# Patient Record
Sex: MALE | Race: WHITE | Employment: FULL TIME | URBAN - METROPOLITAN AREA
[De-identification: names, ages, dates, MRNs, and addresses within clinical notes are randomized per-mention and may not be internally consistent; named-entity substitution may affect disease eponyms.]

---

## 2019-11-04 ENCOUNTER — HOSPITAL ENCOUNTER (EMERGENCY)
Facility: CLINIC | Age: 39
Discharge: HOME | End: 2019-11-04
Attending: EMERGENCY MEDICINE | Admitting: EMERGENCY MEDICINE
Payer: COMMERCIAL

## 2019-11-04 VITALS
BODY MASS INDEX: 36.57 KG/M2 | DIASTOLIC BLOOD PRESSURE: 73 MMHG | RESPIRATION RATE: 16 BRPM | WEIGHT: 270 LBS | SYSTOLIC BLOOD PRESSURE: 157 MMHG | TEMPERATURE: 98.8 F | HEIGHT: 72 IN | HEART RATE: 80 BPM | OXYGEN SATURATION: 99 %

## 2019-11-04 DIAGNOSIS — M54.6 LEFT-SIDED THORACIC BACK PAIN, UNSPECIFIED CHRONICITY: ICD-10-CM

## 2019-11-04 LAB
ANION GAP SERPL CALCULATED.3IONS-SCNC: 5 MMOL/L (ref 3–14)
BASOPHILS # BLD AUTO: 0 10E9/L (ref 0–0.2)
BASOPHILS NFR BLD AUTO: 0.4 %
BUN SERPL-MCNC: 16 MG/DL (ref 7–30)
CALCIUM SERPL-MCNC: 8.9 MG/DL (ref 8.5–10.1)
CHLORIDE SERPL-SCNC: 103 MMOL/L (ref 94–109)
CO2 SERPL-SCNC: 31 MMOL/L (ref 20–32)
CREAT SERPL-MCNC: 0.9 MG/DL (ref 0.66–1.25)
D DIMER PPP FEU-MCNC: <0.3 UG/ML FEU (ref 0–0.5)
DIFFERENTIAL METHOD BLD: NORMAL
EOSINOPHIL # BLD AUTO: 0.3 10E9/L (ref 0–0.7)
EOSINOPHIL NFR BLD AUTO: 3.1 %
ERYTHROCYTE [DISTWIDTH] IN BLOOD BY AUTOMATED COUNT: 12.4 % (ref 10–15)
GFR SERPL CREATININE-BSD FRML MDRD: >90 ML/MIN/{1.73_M2}
GLUCOSE SERPL-MCNC: 190 MG/DL (ref 70–99)
HCT VFR BLD AUTO: 43.4 % (ref 40–53)
HGB BLD-MCNC: 15.1 G/DL (ref 13.3–17.7)
IMM GRANULOCYTES # BLD: 0 10E9/L (ref 0–0.4)
IMM GRANULOCYTES NFR BLD: 0.2 %
INTERPRETATION ECG - MUSE: NORMAL
LYMPHOCYTES # BLD AUTO: 2.9 10E9/L (ref 0.8–5.3)
LYMPHOCYTES NFR BLD AUTO: 35.1 %
MCH RBC QN AUTO: 29.5 PG (ref 26.5–33)
MCHC RBC AUTO-ENTMCNC: 34.8 G/DL (ref 31.5–36.5)
MCV RBC AUTO: 85 FL (ref 78–100)
MONOCYTES # BLD AUTO: 0.7 10E9/L (ref 0–1.3)
MONOCYTES NFR BLD AUTO: 8.3 %
NEUTROPHILS # BLD AUTO: 4.4 10E9/L (ref 1.6–8.3)
NEUTROPHILS NFR BLD AUTO: 52.9 %
NRBC # BLD AUTO: 0 10*3/UL
NRBC BLD AUTO-RTO: 0 /100
PLATELET # BLD AUTO: 171 10E9/L (ref 150–450)
POTASSIUM SERPL-SCNC: 4 MMOL/L (ref 3.4–5.3)
RBC # BLD AUTO: 5.11 10E12/L (ref 4.4–5.9)
SODIUM SERPL-SCNC: 139 MMOL/L (ref 133–144)
TROPONIN I SERPL-MCNC: <0.015 UG/L (ref 0–0.04)
WBC # BLD AUTO: 8.3 10E9/L (ref 4–11)

## 2019-11-04 PROCEDURE — 99284 EMERGENCY DEPT VISIT MOD MDM: CPT

## 2019-11-04 PROCEDURE — 84484 ASSAY OF TROPONIN QUANT: CPT | Performed by: EMERGENCY MEDICINE

## 2019-11-04 PROCEDURE — 80048 BASIC METABOLIC PNL TOTAL CA: CPT | Performed by: EMERGENCY MEDICINE

## 2019-11-04 PROCEDURE — 85025 COMPLETE CBC W/AUTO DIFF WBC: CPT | Performed by: EMERGENCY MEDICINE

## 2019-11-04 PROCEDURE — 93005 ELECTROCARDIOGRAM TRACING: CPT

## 2019-11-04 PROCEDURE — 85379 FIBRIN DEGRADATION QUANT: CPT | Performed by: EMERGENCY MEDICINE

## 2019-11-04 ASSESSMENT — ENCOUNTER SYMPTOMS
SHORTNESS OF BREATH: 1
FEVER: 0
BACK PAIN: 1
SORE THROAT: 0
LIGHT-HEADEDNESS: 1
JOINT SWELLING: 1

## 2019-11-04 ASSESSMENT — MIFFLIN-ST. JEOR: SCORE: 2167.71

## 2019-11-04 NOTE — ED AVS SNAPSHOT
Emergency Department  [address] [place] [id number]  Phone:  [phone]  Fax:  [phone]                                    Brian Velasquez Jr.   MRN: [id number]    Department:   Emergency Department   Date of Visit:  11/4/2019           After Visit Summary Signature Page    I have received my discharge instructions, and my questions have been answered. I have discussed any challenges I see with this plan with the nurse or doctor.    ..........................................................................................................................................  Patient/Patient Representative Signature      ..........................................................................................................................................  Patient Representative Print Name and Relationship to Patient    ..................................................               ................................................  Date                                   Time    ..........................................................................................................................................  Reviewed by Signature/Title    ...................................................              ..............................................  Date                                               Time          22EPIC Rev 08/18

## 2019-11-04 NOTE — ED PROVIDER NOTES
"  History     Chief Complaint:  Shortness of Breath    The history is provided by the patient.      Brian Velasquez Jr. is a 39 year old male, with history of PE following a shoulder surgery in 2012, not currently anticoagulated, taking aspirin, who presents with his wife for evaluation of gradually worsening shortness of breath that began today with associated chest and back pain and general feeling of being unwell. Patient reports that he woke up feeling \"crummy\" and throughout the day, began to experience a knot-like no-radiating pain in his left chest shooting into his back, that is pleuritic in nature, with onset of shortness of breath. Patient had a recent left wrist surgery 4 days ago and as symptoms feel similar to when patient had a PE, he was prompted to present.     Here, patient reports that he is not on a blood thinner after the surgery. He also endorses some lightheadedness. He notes that for the vast majority since his surgery, he has not really been active. He states that he started the baby aspirin the day after his surgery and did take a dose today. He states he does feel a \"lot of pressure on my wrist than typical\", localized to where the surgery was performed. He denies any leg swelling, sore throat or fever.     Cardiac/PE/DVT Risk Factors:  History of hypertension - Yes  History of hyperlipidemia - No  History of diabetes - No  History of smoking - No  Personal history of PE/DVT - Yes  Family history of PE/DVT - Yes  Family history of heart complications - Yes  Recent travel - To [place] 5 days ago  Recent surgery - Yes  Other immobilizations - Not very active post surgery.  Cancer - No     Allergies:  No Known Drug Allergies     Medications:    Lisinopril  Aspirin    Past Medical History:    Hypertension  PE    Past Surgical History:    Orthopedic surgery  Shoulder surgery - right  Tonsillectomy    Family History:    Mother - heart disease, esophageal varices, thrombocytopenia, blood " clot  Father - hypertension    Social History:  The patient was accompanied to the ED by spouse.  Smoking Status: No  Smokeless Tobacco: N/A  Alcohol Use: Yes  Drug Use: No   Marital Status:   [2]     Review of Systems   Constitutional: Negative for fever.   HENT: Negative for sore throat.    Respiratory: Positive for shortness of breath.    Cardiovascular: Positive for chest pain. Negative for leg swelling.   Musculoskeletal: Positive for back pain and joint swelling.   Neurological: Positive for light-headedness.   All other systems reviewed and are negative.      Physical Exam   Vitals:  Patient Vitals for the past 24 hrs:   BP Temp Temp src Pulse Heart Rate Resp SpO2 Height Weight   11/04/19 1215 (!) 157/73 -- -- -- -- -- -- -- --   11/04/19 1212 -- 98.8  F (37.1  C) Temporal 80 80 18 99 % 1.829 m (6') 122.5 kg (270 lb)      Physical Exam  General: Resting comfortably on the gurney  Head:  The scalp, face, and head appear normal  Eyes:  The pupils are equal, round, and reactive to light    There is no nystagmus    Extraocular muscles are intact    Conjunctivae and sclerae are normal  ENT:    The nose is normal    Pinnae are normal    The oropharynx is normal    Uvula is in the midline  Neck:  Normal range of motion    There is no rigidity noted    There is no midline cervical spine pain/tenderness    Trachea is in the midline    No mass is detected  CV:  Regular rate and underlying rhythm     Normal S1/S2, no S3/S4    No pathological murmur detected  Resp:  Lungs are clear    There is no tachypnea    Non-labored    No rales    No wheezing   GI:  Abdomen is soft, there is no rigidity    No distension    No tympani    No rebound tenderness     Non-surgical without peritoneal features  MS:  There is a plaster splint to the left arm that extends from the MCP joints to the proximal forearm.  This is not cylindrical there is a gap in the plaster on the ulnar aspect of the forearm.  There is normal capillary  refill.  There is mild swelling to the fingers, typical in the postoperative setting.  Patient does not feel that his went  is overly tight or throbbing it just feels uncomfortable like a typical splint.    Skin:  No rash or acute skin lesions noted  Neuro: Speech is normal and fluent  Psych:  Awake. Alert.      Normal affect.  Appropriate interactions.  Lymph: No anterior cervical lymphadenopathy noted    Emergency Department Course     ECG:  ECG taken at 1216, ECG read at 1218 by Dr. Brian Mcnamara MD  Normal sinus rhythm with sinus arrhythmia  Normal ECG  Rate 78 bpm. IA interval 192. QRS duration 102. QT/QTc 380/433. P-R-T axes 52 69 51.     Laboratory:  Laboratory findings were communicated with the patient and family who voiced understanding of the findings.  CBC: AWNL (WBC 8.3, HGB 15.1, )   BMP: Glucose 190 (H) o/w WNL (Creatinine 0.90)   Troponin (Collected 1233): <0.015  D Dimer (Collected 1233): <0.3     Emergency Department Course:  Nursing notes and vitals reviewed.  EKG obtained in the ED, see results above.   IV was inserted and blood was drawn for laboratory testing, results above.    (1237)   I performed an exam of the patient as documented above. History obtained from patient.    (1413)   Updated patient regarding results. Discussed plan of care and patient will be discharged.     Findings and plan explained to the Patient and spouse. Patient discharged home with instructions regarding supportive care, medications, and reasons to return. The importance of close follow-up was reviewed.      I personally reviewed the laboratory results with the Patient and spouse and answered all related questions prior to discharge.    Impression & Plan      Medical Decision Making:  Patient presents with some mild left thoracic region back pain.  He did have recent left wrist surgery.  He has a history of remote PE in 2012 after shoulder surgery.  He has not been maintained on a novel anticoagulant and has no  history of hypercoagulability.  The patient does not have any significant swelling to the left humeral area proximal to his splint.  Given radiation concerns, I did a quantitative d-dimer which was undetectable.  This makes DVT and PE highly unlikely.  Screening troponin was also negative making cardiac etiology is unlikely as well.  The patient does note he has been sleeping in a chair for several days and likely disrupted his back slightly and doing so.  We are not going to proceed with CT scan of the chest at this juncture given the patient's lack of pleuritic chest pain, no shortness of breath, no tachycardia, negative quantitative d-dimer, and no significant swelling to the proximal left arm.    Diagnosis:    ICD-10-CM    1. Left-sided thoracic back pain, unspecified chronicity M54.6         Disposition:   Discharged to home     Scribe Disclosure:  I, Evelyn Cabral, am serving as a scribe at 12:31 PM on 11/4/2019 to document services personally performed by Brian Mcnamara MD, based on my observations and the provider's statements to me.  11/4/2019    EMERGENCY DEPARTMENT       Brian Mcnamara MD  11/04/19 5807

## 2019-11-04 NOTE — DISCHARGE INSTRUCTIONS
Follow-up with your hand surgeon later this week as planned.  If he developed increased swelling in the left arm or chest pain with shortness of breath feel free to return to the emergency department for reassessment    Discharge Instructions  Back Pain  You were seen today for back pain. Back pain can have many causes, but most will get better without surgery or other specific treatment. Sometimes there is a herniated ( slipped ) disc. We do not usually do MRI scans to look for these right away, since most herniated discs will get better on their own with time.  Today, we did not find any evidence that your back pain was caused by a serious condition. However, sometimes symptoms develop over time and cannot be found during an emergency visit, so it is very important that you follow up with your primary provider.  Generally, every Emergency Department visit should have a follow-up clinic visit with either a primary or a specialty clinic/provider. Please follow-up as instructed by your emergency provider today.    Return to the Emergency Department if:  You develop a fever with your back pain.   You have weakness or change in sensation in one or both legs.  You lose control of your bowels or bladder, or cannot empty your bladder (cannot pee).  Your pain gets much worse.     Follow-up with your provider:  Unless your pain has completely gone away, please make an appointment with your provider within one week. Most of the routine care for back pain is available in a clinic and not the Emergency Department. You may need further management of your back pain, such as more pain medication, imaging such as an X-ray or MRI, or physical therapy.    What can I do to help myself?  Remain Active -- People are often afraid that they will hurt their back further or delay recovery by remaining active, but this is one of the best things you can do for your back. In fact, staying in bed for a long time to rest is not recommended.  Studies have shown that people with low back pain recover faster when they remain active. Movement helps to bring blood flow to the muscles and relieve muscle spasms as well as preventing loss of muscle strength.  Heat -- Using a heating pad can help with low back pain during the first few weeks. Do not sleep with a heating pad, as you can be burned.   Pain medications - You may take a pain medication such as Tylenol  (acetaminophen), Advil , Motrin  (ibuprofen) or Aleve  (naproxen).  If you were given a prescription for medicine here today, be sure to read all of the information (including the package insert) that comes with your prescription.  This will include important information about the medicine, its side effects, and any warnings that you need to know about.  The pharmacist who fills the prescription can provide more information and answer questions you may have about the medicine.  If you have questions or concerns that the pharmacist cannot address, please call or return to the Emergency Department.   Remember that you can always come back to the Emergency Department if you are not able to see your regular provider in the amount of time listed above, if you get any new symptoms, or if there is anything that worries you.

## 2019-11-04 NOTE — ED TRIAGE NOTES
Pt had wrist surgery on Thursday.  Today feeling unwell, pain to back and some mild SOB.  Pt has hx of PE after a surgery in 2012.  Pt has been taking ASA but not on a blood thinner after surgery.  Symptoms feel the same as when pt had PE.

## 2021-03-22 PROBLEM — M25.551 RIGHT HIP PAIN: Status: ACTIVE | Noted: 2021-03-22

## 2021-04-11 ENCOUNTER — APPOINTMENT (OUTPATIENT)
Dept: GENERAL RADIOLOGY | Facility: HOSPITAL | Age: 41
End: 2021-04-11
Attending: EMERGENCY MEDICINE
Payer: COMMERCIAL

## 2021-04-11 ENCOUNTER — HOSPITAL ENCOUNTER (EMERGENCY)
Facility: HOSPITAL | Age: 41
Discharge: HOME | End: 2021-04-11
Attending: EMERGENCY MEDICINE
Payer: COMMERCIAL

## 2021-04-11 VITALS
HEART RATE: 80 BPM | TEMPERATURE: 99 F | OXYGEN SATURATION: 98 % | RESPIRATION RATE: 16 BRPM | SYSTOLIC BLOOD PRESSURE: 124 MMHG | WEIGHT: 265 LBS | DIASTOLIC BLOOD PRESSURE: 80 MMHG | BODY MASS INDEX: 36 KG/M2

## 2021-04-11 DIAGNOSIS — S66.911A HAND STRAIN, RIGHT, INITIAL ENCOUNTER: ICD-10-CM

## 2021-04-11 DIAGNOSIS — S66.911A MUSCLE STRAIN OF RIGHT WRIST, INITIAL ENCOUNTER: Primary | ICD-10-CM

## 2021-04-11 PROCEDURE — 73130 X-RAY EXAM OF HAND: CPT | Performed by: EMERGENCY MEDICINE

## 2021-04-11 PROCEDURE — 99283 EMERGENCY DEPT VISIT LOW MDM: CPT

## 2021-04-11 PROCEDURE — 73110 X-RAY EXAM OF WRIST: CPT | Performed by: EMERGENCY MEDICINE

## 2021-04-11 NOTE — ED PROVIDER NOTES
Patient Seen in: [facility] Emergency Department      History   Patient presents with:  Arm or Hand Injury    Stated Complaint: pt playing basebal felt pop in wrist and hand right wrist    HPI/Subjective:   HPI  This is a 49-year-old male who states Triage Vitals [04/11/21 1644]   /87   Pulse 85   Resp 18   Temp 98.9 °F (37.2 °C)   Temp src Temporal   SpO2 98 %   O2 Device None (Room air)       Current:/87   Pulse 85   Temp 98.9 °F (37.2 °C) (Temporal)   Resp 18   Wt 120.2 kg   SpO2 98% RIGHT (CPT=73110)    Result Date: 4/11/2021  PROCEDURE:  XR WRIST COMPLETE (MIN 3 VIEWS), RIGHT (CPT=73110)  TECHNIQUE:  Three views were obtained.   COMPARISON:  [facility] , XR, XR HAND (MIN 3 VIEWS), RIGHT (CPT=73130), 4/11/2021, 5:00 PM.  INDICATIONS:  pt p

## 2021-04-13 PROBLEM — S63.501A WRIST SPRAIN, RIGHT, INITIAL ENCOUNTER: Status: ACTIVE | Noted: 2021-04-13

## 2021-10-25 PROBLEM — E11.9 TYPE 2 DIABETES MELLITUS (HCC): Status: ACTIVE | Noted: 2021-01-01

## 2021-12-14 ENCOUNTER — APPOINTMENT (OUTPATIENT)
Dept: GENERAL RADIOLOGY | Facility: HOSPITAL | Age: 41
End: 2021-12-14
Attending: ORTHOPAEDIC SURGERY
Payer: COMMERCIAL

## 2021-12-14 ENCOUNTER — ANESTHESIA EVENT (OUTPATIENT)
Dept: SURGERY | Facility: HOSPITAL | Age: 41
End: 2021-12-14
Payer: COMMERCIAL

## 2021-12-14 ENCOUNTER — ANESTHESIA (OUTPATIENT)
Dept: SURGERY | Facility: HOSPITAL | Age: 41
End: 2021-12-14
Payer: COMMERCIAL

## 2021-12-14 ENCOUNTER — HOSPITAL ENCOUNTER (OUTPATIENT)
Facility: HOSPITAL | Age: 41
Setting detail: HOSPITAL OUTPATIENT SURGERY
Discharge: HOME | End: 2021-12-14
Attending: ORTHOPAEDIC SURGERY | Admitting: ORTHOPAEDIC SURGERY
Payer: COMMERCIAL

## 2021-12-14 VITALS
BODY MASS INDEX: 34.67 KG/M2 | HEIGHT: 72 IN | HEART RATE: 90 BPM | WEIGHT: 256 LBS | DIASTOLIC BLOOD PRESSURE: 77 MMHG | RESPIRATION RATE: 16 BRPM | SYSTOLIC BLOOD PRESSURE: 139 MMHG | TEMPERATURE: 98 F | OXYGEN SATURATION: 99 %

## 2021-12-14 DIAGNOSIS — Z01.818 PRE-OP TESTING: Primary | ICD-10-CM

## 2021-12-14 DIAGNOSIS — M25.851 FEMOROACETABULAR IMPINGEMENT OF RIGHT HIP: ICD-10-CM

## 2021-12-14 PROCEDURE — 82962 GLUCOSE BLOOD TEST: CPT

## 2021-12-14 PROCEDURE — 76000 FLUOROSCOPY <1 HR PHYS/QHP: CPT | Performed by: ORTHOPAEDIC SURGERY

## 2021-12-14 PROCEDURE — 0SQ94ZZ REPAIR RIGHT HIP JOINT, PERCUTANEOUS ENDOSCOPIC APPROACH: ICD-10-PCS | Performed by: ORTHOPAEDIC SURGERY

## 2021-12-14 PROCEDURE — 0QB64ZZ EXCISION OF RIGHT UPPER FEMUR, PERCUTANEOUS ENDOSCOPIC APPROACH: ICD-10-PCS | Performed by: ORTHOPAEDIC SURGERY

## 2021-12-14 DEVICE — QFIX 1.8 MINI SUTURE ANCHOR
Type: IMPLANTABLE DEVICE | Site: HIP | Status: FUNCTIONAL
Brand: Q-FIX

## 2021-12-14 RX ORDER — HYDROCODONE BITARTRATE AND ACETAMINOPHEN 10; 325 MG/1; MG/1
1 TABLET ORAL EVERY 6 HOURS PRN
Qty: 28 TABLET | Refills: 0 | Status: SHIPPED | OUTPATIENT
Start: 2021-12-14 | End: 2022-01-04 | Stop reason: ALTCHOICE

## 2021-12-14 RX ORDER — LIDOCAINE HYDROCHLORIDE 10 MG/ML
INJECTION, SOLUTION EPIDURAL; INFILTRATION; INTRACAUDAL; PERINEURAL AS NEEDED
Status: DISCONTINUED | OUTPATIENT
Start: 2021-12-14 | End: 2021-12-14 | Stop reason: SURG

## 2021-12-14 RX ORDER — MIDAZOLAM HYDROCHLORIDE 1 MG/ML
INJECTION INTRAMUSCULAR; INTRAVENOUS AS NEEDED
Status: DISCONTINUED | OUTPATIENT
Start: 2021-12-14 | End: 2021-12-14 | Stop reason: SURG

## 2021-12-14 RX ORDER — SODIUM CHLORIDE, SODIUM LACTATE, POTASSIUM CHLORIDE, CALCIUM CHLORIDE 600; 310; 30; 20 MG/100ML; MG/100ML; MG/100ML; MG/100ML
INJECTION, SOLUTION INTRAVENOUS CONTINUOUS
Status: DISCONTINUED | OUTPATIENT
Start: 2021-12-14 | End: 2021-12-14

## 2021-12-14 RX ORDER — NALOXONE HYDROCHLORIDE 0.4 MG/ML
80 INJECTION, SOLUTION INTRAMUSCULAR; INTRAVENOUS; SUBCUTANEOUS AS NEEDED
Status: DISCONTINUED | OUTPATIENT
Start: 2021-12-14 | End: 2021-12-14

## 2021-12-14 RX ORDER — CEFAZOLIN SODIUM/WATER 2 G/20 ML
2 SYRINGE (ML) INTRAVENOUS ONCE
Status: COMPLETED | OUTPATIENT
Start: 2021-12-14 | End: 2021-12-14

## 2021-12-14 RX ORDER — ONDANSETRON 2 MG/ML
4 INJECTION INTRAMUSCULAR; INTRAVENOUS EVERY 6 HOURS PRN
Status: CANCELLED | OUTPATIENT
Start: 2021-12-14

## 2021-12-14 RX ORDER — EPHEDRINE SULFATE 50 MG/ML
INJECTION, SOLUTION INTRAVENOUS AS NEEDED
Status: DISCONTINUED | OUTPATIENT
Start: 2021-12-14 | End: 2021-12-14 | Stop reason: SURG

## 2021-12-14 RX ORDER — INSULIN ASPART 100 [IU]/ML
INJECTION, SOLUTION INTRAVENOUS; SUBCUTANEOUS ONCE
Status: DISCONTINUED | OUTPATIENT
Start: 2021-12-14 | End: 2021-12-14

## 2021-12-14 RX ORDER — TRIAMCINOLONE ACETONIDE 40 MG/ML
INJECTION, SUSPENSION INTRA-ARTICULAR; INTRAMUSCULAR AS NEEDED
Status: DISCONTINUED | OUTPATIENT
Start: 2021-12-14 | End: 2021-12-14 | Stop reason: HOSPADM

## 2021-12-14 RX ORDER — NEOSTIGMINE METHYLSULFATE 1 MG/ML
INJECTION INTRAVENOUS AS NEEDED
Status: DISCONTINUED | OUTPATIENT
Start: 2021-12-14 | End: 2021-12-14 | Stop reason: SURG

## 2021-12-14 RX ORDER — HYDROCODONE BITARTRATE AND ACETAMINOPHEN 5; 325 MG/1; MG/1
1 TABLET ORAL AS NEEDED
Status: COMPLETED | OUTPATIENT
Start: 2021-12-14 | End: 2021-12-14

## 2021-12-14 RX ORDER — ONDANSETRON 2 MG/ML
4 INJECTION INTRAMUSCULAR; INTRAVENOUS ONCE AS NEEDED
Status: DISCONTINUED | OUTPATIENT
Start: 2021-12-14 | End: 2021-12-14

## 2021-12-14 RX ORDER — HYDROMORPHONE HYDROCHLORIDE 1 MG/ML
0.2 INJECTION, SOLUTION INTRAMUSCULAR; INTRAVENOUS; SUBCUTANEOUS EVERY 5 MIN PRN
Status: DISCONTINUED | OUTPATIENT
Start: 2021-12-14 | End: 2021-12-14

## 2021-12-14 RX ORDER — ONDANSETRON 2 MG/ML
INJECTION INTRAMUSCULAR; INTRAVENOUS AS NEEDED
Status: DISCONTINUED | OUTPATIENT
Start: 2021-12-14 | End: 2021-12-14 | Stop reason: SURG

## 2021-12-14 RX ORDER — ROCURONIUM BROMIDE 10 MG/ML
INJECTION, SOLUTION INTRAVENOUS AS NEEDED
Status: DISCONTINUED | OUTPATIENT
Start: 2021-12-14 | End: 2021-12-14 | Stop reason: SURG

## 2021-12-14 RX ORDER — GLYCOPYRROLATE 0.2 MG/ML
INJECTION, SOLUTION INTRAMUSCULAR; INTRAVENOUS AS NEEDED
Status: DISCONTINUED | OUTPATIENT
Start: 2021-12-14 | End: 2021-12-14 | Stop reason: SURG

## 2021-12-14 RX ORDER — ACETAMINOPHEN 500 MG
1000 TABLET ORAL ONCE
Status: COMPLETED | OUTPATIENT
Start: 2021-12-14 | End: 2021-12-14

## 2021-12-14 RX ORDER — HYDROMORPHONE HYDROCHLORIDE 1 MG/ML
0.4 INJECTION, SOLUTION INTRAMUSCULAR; INTRAVENOUS; SUBCUTANEOUS EVERY 5 MIN PRN
Status: DISCONTINUED | OUTPATIENT
Start: 2021-12-14 | End: 2021-12-14

## 2021-12-14 RX ORDER — FAMOTIDINE 20 MG/1
20 TABLET ORAL ONCE
Status: COMPLETED | OUTPATIENT
Start: 2021-12-14 | End: 2021-12-14

## 2021-12-14 RX ORDER — HALOPERIDOL 5 MG/ML
0.25 INJECTION INTRAMUSCULAR ONCE AS NEEDED
Status: DISCONTINUED | OUTPATIENT
Start: 2021-12-14 | End: 2021-12-14

## 2021-12-14 RX ORDER — MORPHINE SULFATE 10 MG/ML
6 INJECTION, SOLUTION INTRAMUSCULAR; INTRAVENOUS EVERY 10 MIN PRN
Status: DISCONTINUED | OUTPATIENT
Start: 2021-12-14 | End: 2021-12-14

## 2021-12-14 RX ORDER — MORPHINE SULFATE 4 MG/ML
4 INJECTION, SOLUTION INTRAMUSCULAR; INTRAVENOUS EVERY 10 MIN PRN
Status: DISCONTINUED | OUTPATIENT
Start: 2021-12-14 | End: 2021-12-14

## 2021-12-14 RX ORDER — DEXTROSE MONOHYDRATE 25 G/50ML
50 INJECTION, SOLUTION INTRAVENOUS
Status: DISCONTINUED | OUTPATIENT
Start: 2021-12-14 | End: 2021-12-14

## 2021-12-14 RX ORDER — HYDROMORPHONE HYDROCHLORIDE 1 MG/ML
0.6 INJECTION, SOLUTION INTRAMUSCULAR; INTRAVENOUS; SUBCUTANEOUS EVERY 5 MIN PRN
Status: DISCONTINUED | OUTPATIENT
Start: 2021-12-14 | End: 2021-12-14

## 2021-12-14 RX ORDER — HYDROCODONE BITARTRATE AND ACETAMINOPHEN 5; 325 MG/1; MG/1
2 TABLET ORAL AS NEEDED
Status: COMPLETED | OUTPATIENT
Start: 2021-12-14 | End: 2021-12-14

## 2021-12-14 RX ORDER — BUPIVACAINE HYDROCHLORIDE AND EPINEPHRINE 2.5; 5 MG/ML; UG/ML
INJECTION, SOLUTION INFILTRATION; PERINEURAL AS NEEDED
Status: DISCONTINUED | OUTPATIENT
Start: 2021-12-14 | End: 2021-12-14 | Stop reason: HOSPADM

## 2021-12-14 RX ORDER — METOCLOPRAMIDE 10 MG/1
10 TABLET ORAL ONCE
Status: COMPLETED | OUTPATIENT
Start: 2021-12-14 | End: 2021-12-14

## 2021-12-14 RX ORDER — INDOMETHACIN 75 MG/1
75 CAPSULE, EXTENDED RELEASE ORAL
Qty: 20 CAPSULE | Refills: 0 | Status: SHIPPED | OUTPATIENT
Start: 2021-12-14 | End: 2022-02-03

## 2021-12-14 RX ORDER — PROCHLORPERAZINE EDISYLATE 5 MG/ML
5 INJECTION INTRAMUSCULAR; INTRAVENOUS ONCE AS NEEDED
Status: DISCONTINUED | OUTPATIENT
Start: 2021-12-14 | End: 2021-12-14

## 2021-12-14 RX ORDER — MORPHINE SULFATE 4 MG/ML
2 INJECTION, SOLUTION INTRAMUSCULAR; INTRAVENOUS EVERY 10 MIN PRN
Status: DISCONTINUED | OUTPATIENT
Start: 2021-12-14 | End: 2021-12-14

## 2021-12-14 RX ADMIN — EPHEDRINE SULFATE 5 MG: 50 INJECTION, SOLUTION INTRAVENOUS at 11:34:00

## 2021-12-14 RX ADMIN — ROCURONIUM BROMIDE 50 MG: 10 INJECTION, SOLUTION INTRAVENOUS at 10:30:00

## 2021-12-14 RX ADMIN — LIDOCAINE HYDROCHLORIDE 50 MG: 10 INJECTION, SOLUTION EPIDURAL; INFILTRATION; INTRACAUDAL; PERINEURAL at 10:30:00

## 2021-12-14 RX ADMIN — CEFAZOLIN SODIUM/WATER 2 G: 2 G/20 ML SYRINGE (ML) INTRAVENOUS at 10:35:00

## 2021-12-14 RX ADMIN — MIDAZOLAM HYDROCHLORIDE 2 MG: 1 INJECTION INTRAMUSCULAR; INTRAVENOUS at 10:25:00

## 2021-12-14 RX ADMIN — GLYCOPYRROLATE 0.8 MG: 0.2 INJECTION, SOLUTION INTRAMUSCULAR; INTRAVENOUS at 12:38:00

## 2021-12-14 RX ADMIN — SODIUM CHLORIDE, SODIUM LACTATE, POTASSIUM CHLORIDE, CALCIUM CHLORIDE: 600; 310; 30; 20 INJECTION, SOLUTION INTRAVENOUS at 12:56:00

## 2021-12-14 RX ADMIN — ONDANSETRON 4 MG: 2 INJECTION INTRAMUSCULAR; INTRAVENOUS at 12:34:00

## 2021-12-14 RX ADMIN — NEOSTIGMINE METHYLSULFATE 5 MG: 1 INJECTION INTRAVENOUS at 12:38:00

## 2021-12-14 NOTE — H&P
History & Physical Examination    Patient Name: Zee Crowell MRN: [id number]  [facility]: [id number]  YOB: 1980    Diagnosis: right hip impingement    Present Illness: right hip pain no responsive to conservative measures.   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

## 2021-12-14 NOTE — ANESTHESIA PROCEDURE NOTES
Airway  Date/Time: 12/14/2021 10:33 AM  Urgency: Elective      General Information and Staff    Patient location during procedure: OR  Anesthesiologist: Benedicto Naqvi MD  Resident/CRNA: Beverly Marquez CRNA  Performed: CRNA     Indications and Patient C

## 2021-12-14 NOTE — ANESTHESIA PREPROCEDURE EVALUATION
Anesthesia PreOp Note    HPI:     Cierra Shah is a 35year old male who presents for preoperative consultation requested by:  Vanderbilt Hatchet, MD    Date of Surgery: 12/14/2021    Procedure(s):  RIGHT HIP ARTHROSCOPY, LABRAL REPAIR AND C (MULTIVITAMIN ADULT OR), Take by mouth daily. , Disp: , Rfl: , 12/9/2021  metFORMIN 500 MG Oral Tab, Take 1 tablet (500 mg total) by mouth daily with dinner., Disp: 90 tablet, Rfl: 1, 12/12/2021  atorvastatin 10 MG Oral Tab, Take 1 tablet (10 mg total) by m file  Intimate Partner Violence: Not on file  Housing Stability: Not on file    Available pre-op labs reviewed.   Lab Results   Component Value Date    WBC 7.78 10/21/2021    RBC 5.27 10/21/2021    HGB 15.5 10/21/2021    HCT 46.1 10/21/2021    MCV 87.5 10/2 Discussed With:  Patient  Discussed plan with:  CRNA and surgeon      I have informed Nikia Saul. and/or legal guardian or family member of the nature of the anesthetic plan, benefits, risks including possible dental damage if relevant, dunia

## 2021-12-15 NOTE — OPERATIVE REPORT
[facility]    PATIENT'S NAME: Marjan Blair.    ATTENDING PHYSICIAN: Pia Arredondo MD   OPERATING PHYSICIAN: Pia Arredondo MD   PATIENT ACCOUNT#:   [id number]    LOCATION:  [facility] 10  MEDICAL RECORD #:   [id number] and 3 o'clock with gross detachment and unstable labral tearing with flaps. Chondrolabral delamination was also noted. Mild osteoarthritic changes were noted primarily of the superior femoral head and the chondrolabral junction.   Ligamentum teres was int anterolateral portal and passed the suture around the labrum in simple fashion. This reattached the labrum back to the acetabular rim and reinforced it. Four alternating post half-hitches were tied and excess suture cut.   We released traction and saw goo
